# Patient Record
Sex: FEMALE
[De-identification: names, ages, dates, MRNs, and addresses within clinical notes are randomized per-mention and may not be internally consistent; named-entity substitution may affect disease eponyms.]

---

## 2022-06-20 PROBLEM — Z00.00 ENCOUNTER FOR PREVENTIVE HEALTH EXAMINATION: Status: ACTIVE | Noted: 2022-06-20

## 2022-06-22 ENCOUNTER — APPOINTMENT (OUTPATIENT)
Dept: OBGYN | Facility: CLINIC | Age: 52
End: 2022-06-22
Payer: COMMERCIAL

## 2022-06-22 VITALS
BODY MASS INDEX: 31.98 KG/M2 | DIASTOLIC BLOOD PRESSURE: 80 MMHG | SYSTOLIC BLOOD PRESSURE: 128 MMHG | HEIGHT: 66 IN | WEIGHT: 199 LBS

## 2022-06-22 DIAGNOSIS — Z12.11 ENCOUNTER FOR SCREENING FOR MALIGNANT NEOPLASM OF COLON: ICD-10-CM

## 2022-06-22 DIAGNOSIS — Z63.4 DISAPPEARANCE AND DEATH OF FAMILY MEMBER: ICD-10-CM

## 2022-06-22 DIAGNOSIS — Z12.12 ENCOUNTER FOR SCREENING FOR MALIGNANT NEOPLASM OF COLON: ICD-10-CM

## 2022-06-22 DIAGNOSIS — Z82.49 FAMILY HISTORY OF ISCHEMIC HEART DISEASE AND OTHER DISEASES OF THE CIRCULATORY SYSTEM: ICD-10-CM

## 2022-06-22 PROCEDURE — 99386 PREV VISIT NEW AGE 40-64: CPT | Mod: 25

## 2022-06-22 PROCEDURE — 99203 OFFICE O/P NEW LOW 30 MIN: CPT

## 2022-06-22 SDOH — SOCIAL STABILITY - SOCIAL INSECURITY: DISSAPEARANCE AND DEATH OF FAMILY MEMBER: Z63.4

## 2022-06-22 NOTE — PLAN
[FreeTextEntry1] : Unremarkable CBE and pelvic exam\par scan blood in vaginal vault, no polyps\par referred for Endosee/EMB for further evaluation\par Pap and HPV collected\par Mammogram ordered\par Dexascan ordered for long-term smoking in post menopausal woman\par Lung Scan ordered\par Referral for colonoscopy provided\par Patient counseled on smoking cessation but does not feel ready to quit at present\par Healthy diet, exercise, sleep hygiene discussed\par RTO x 1 year or prn\par

## 2022-06-22 NOTE — HISTORY OF PRESENT ILLNESS
[HIV test declined] : HIV test declined [Syphilis test declined] : Syphilis test declined [Gonorrhea test declined] : Gonorrhea test declined [Chlamydia test declined] : Chlamydia test declined [Trichomonas test declined] : Trichomonas test declined [HPV test declined] : HPV test declined [postmenopausal] : postmenopausal [N] : Patient is not sexually active [Y] : Positive pregnancy history [___ Days] : [unfilled] days [Light Bleeding] : light bleeding [FreeTextEntry1] : 50 yo  s/p menopause at age 50 with recent history of painless vaginal bleeding which started 6 days ago accompanied by breast tenderness.  She was  in March of this year.    Hx ovarian cyst and myoma, chlamydia . Abnormal Pap in  with "laser procedure' to remove abnormal cells (LEEP?).  Last Pap smear .  Current smoker.  \par TVUS at [facility] indicates EML of 0.4cm with 3mm cystic area.  Simple left ovarian cyst 3.0 x 2.7 x 2.9, intramural fibroid 2.5 x 2.8 x 2.7 and nabothian cysts noted.   [TextBox_4] : Last well woman exam 2006.  She has never had a mammogram, colonoscopy or DexaScan [LMPDate] : 2021 [PGHxTotal] : 3 [[facility] Hospit[facility]Term] : 3 [[facility] Hospitaliving] : 3 [TextBox_28] : Postmenopausal with breast tenderness

## 2022-06-23 LAB
FSH SERPL-MCNC: 17 IU/L
HPV HIGH+LOW RISK DNA PNL CVX: NOT DETECTED
TSH SERPL-ACNC: 1.74 UIU/ML

## 2022-06-24 ENCOUNTER — APPOINTMENT (OUTPATIENT)
Dept: MAMMOGRAPHY | Facility: CLINIC | Age: 52
End: 2022-06-24

## 2022-06-24 ENCOUNTER — RESULT REVIEW (OUTPATIENT)
Age: 52
End: 2022-06-24

## 2022-06-24 ENCOUNTER — APPOINTMENT (OUTPATIENT)
Dept: RADIOLOGY | Facility: CLINIC | Age: 52
End: 2022-06-24

## 2022-06-24 PROCEDURE — 77085 DXA BONE DENSITY AXL VRT FX: CPT

## 2022-06-24 PROCEDURE — 77067 SCR MAMMO BI INCL CAD: CPT

## 2022-06-24 PROCEDURE — 77063 BREAST TOMOSYNTHESIS BI: CPT

## 2022-06-28 LAB — CYTOLOGY CVX/VAG DOC THIN PREP: NORMAL

## 2022-06-30 ENCOUNTER — APPOINTMENT (OUTPATIENT)
Dept: OBGYN | Facility: CLINIC | Age: 52
End: 2022-06-30

## 2022-06-30 VITALS
SYSTOLIC BLOOD PRESSURE: 120 MMHG | BODY MASS INDEX: 31.98 KG/M2 | DIASTOLIC BLOOD PRESSURE: 66 MMHG | WEIGHT: 199 LBS | HEIGHT: 66 IN

## 2022-06-30 DIAGNOSIS — A63.0 ANOGENITAL (VENEREAL) WARTS: ICD-10-CM

## 2022-06-30 PROCEDURE — 58558Z: CUSTOM

## 2022-06-30 PROCEDURE — 56501 DESTROY VULVA LESIONS SIM: CPT

## 2022-06-30 NOTE — PHYSICAL EXAM
[Appropriately responsive] : appropriately responsive [Alert] : alert [No Acute Distress] : no acute distress [Soft] : soft [Non-tender] : non-tender [Non-distended] : non-distended [Oriented x3] : oriented x3 [Labia Majora] : normal [Labia Minora] : normal on the right [No Bleeding] : There was no active vaginal bleeding [Normal] : normal [Normal Position] : in a normal position [Uterine Adnexae] : normal [Declined] : Patient declined rectal exam [Tenderness] : nontender [Enlarged ___ wks] : not enlarged [Mass ___ cm] : no uterine mass was palpated [FreeTextEntry2] : genital wart to superior portion of left labia minora

## 2022-06-30 NOTE — PROCEDURE
[Hysteroscopy] : Hysteroscopy [Time out performed] : Pre-procedure time out performed.  Patient's name, date of birth and procedure confirmed. [Consent Obtained] : Consent obtained [Postmenopausal bleeding] : postmenopausal bleeding [Risks] : risks [Benefits] : benefits [Patient] : patient [Infection] : infection [Bleeding] : bleeding [Ibuprofen ___ mg] : ibuprofen [unfilled] ~Umg [flexible] : Using aseptic technique a hysteroscopy was performed using a flexible hysteroscope [Sent to Pathology] : specimen was placed in buffered formalin and sent for pathology [Hemostasis obtained] : hemostasis obtained [Tolerated Well] : Patient tolerated the procedure well [Antibiotics given] : antibiotics not given [de-identified] : Endosee hysteroscope inserted without difficulty   Lower uterine segment posterior wall with area of thickened lining   No obvious polyps or submucosal fibroids noted\par \par Pipelle inserted for endometrial biopsy [de-identified] : will notify patient of results and notify if any further follow up is required or recommended

## 2022-07-07 ENCOUNTER — NON-APPOINTMENT (OUTPATIENT)
Age: 52
End: 2022-07-07

## 2022-07-07 LAB — CORE LAB BIOPSY: NORMAL

## 2023-05-19 ENCOUNTER — APPOINTMENT (OUTPATIENT)
Dept: ULTRASOUND IMAGING | Facility: CLINIC | Age: 53
End: 2023-05-19
Payer: MEDICAID

## 2023-05-19 ENCOUNTER — TRANSCRIPTION ENCOUNTER (OUTPATIENT)
Age: 53
End: 2023-05-19

## 2023-05-19 PROCEDURE — 76536 US EXAM OF HEAD AND NECK: CPT

## 2023-05-22 ENCOUNTER — NON-APPOINTMENT (OUTPATIENT)
Age: 53
End: 2023-05-22

## 2023-05-22 VITALS — BODY MASS INDEX: 31.98 KG/M2 | HEIGHT: 66 IN | WEIGHT: 199 LBS

## 2023-05-22 DIAGNOSIS — F17.200 NICOTINE DEPENDENCE, UNSPECIFIED, UNCOMPLICATED: ICD-10-CM

## 2023-05-22 NOTE — HISTORY OF PRESENT ILLNESS
[Current] : Current [TextBox_13] : Ms. VANG is a 52 year old female with a history of HLD.\par \par She was called to review eligibility for Low-Dose CT lung cancer screening.  Reviewed and confirmed that the patient meets screening eligibility criteria:\par \par 52 years old \par \par Smoking Status:  Current Smoker\par \par Number of pack(s) per day: 1\par Number of years smoked: 35\par Number of pack years smokin\par \par No symptoms of lung cancer, including new cough, change in cough, hemoptysis, and unintentional weight loss.\par \par No personal history of lung cancer.  No lung cancer in a first degree relative.  No history of lung disease or occupational exposures. [PacksperDay] : 1 [N_Years] : 35 [PacksperYear] : 35

## 2023-06-01 ENCOUNTER — APPOINTMENT (OUTPATIENT)
Dept: CT IMAGING | Facility: CLINIC | Age: 53
End: 2023-06-01
Payer: MEDICAID

## 2023-06-01 ENCOUNTER — APPOINTMENT (OUTPATIENT)
Dept: MAMMOGRAPHY | Facility: CLINIC | Age: 53
End: 2023-06-01

## 2023-06-01 PROCEDURE — 77063 BREAST TOMOSYNTHESIS BI: CPT

## 2023-06-01 PROCEDURE — 77067 SCR MAMMO BI INCL CAD: CPT

## 2023-06-01 PROCEDURE — 71271 CT THORAX LUNG CANCER SCR C-: CPT

## 2023-07-20 ENCOUNTER — APPOINTMENT (OUTPATIENT)
Dept: ULTRASOUND IMAGING | Facility: CLINIC | Age: 53
End: 2023-07-20
Payer: MEDICAID

## 2023-07-20 PROCEDURE — 76642 ULTRASOUND BREAST LIMITED: CPT | Mod: LT

## 2023-08-15 ENCOUNTER — APPOINTMENT (OUTPATIENT)
Dept: MRI IMAGING | Facility: CLINIC | Age: 53
End: 2023-08-15

## 2024-01-29 ENCOUNTER — APPOINTMENT (OUTPATIENT)
Dept: OBGYN | Facility: CLINIC | Age: 54
End: 2024-01-29
Payer: MEDICAID

## 2024-01-29 VITALS
BODY MASS INDEX: 32.14 KG/M2 | WEIGHT: 200 LBS | DIASTOLIC BLOOD PRESSURE: 94 MMHG | SYSTOLIC BLOOD PRESSURE: 146 MMHG | HEIGHT: 66 IN

## 2024-01-29 DIAGNOSIS — Z01.419 ENCOUNTER FOR GYNECOLOGICAL EXAMINATION (GENERAL) (ROUTINE) W/OUT ABNORMAL FINDINGS: ICD-10-CM

## 2024-01-29 LAB
BILIRUB UR QL STRIP: NORMAL
CLARITY UR: CLEAR
COLLECTION METHOD: NORMAL
GLUCOSE UR-MCNC: NORMAL
HCG UR QL: 0.2 EU/DL
HGB UR QL STRIP.AUTO: NORMAL
KETONES UR-MCNC: NORMAL
LEUKOCYTE ESTERASE UR QL STRIP: NORMAL
NITRITE UR QL STRIP: NORMAL
PH UR STRIP: 7.5
PROT UR STRIP-MCNC: NORMAL
SP GR UR STRIP: 1.01

## 2024-01-29 PROCEDURE — 99396 PREV VISIT EST AGE 40-64: CPT

## 2024-01-29 PROCEDURE — 99213 OFFICE O/P EST LOW 20 MIN: CPT | Mod: 25

## 2024-01-29 NOTE — DISCUSSION/SUMMARY
[FreeTextEntry1] : unremarkable CBE and pelvic exams SBE  reviewed Pap/HPV collected Mammo ordered by PCP TVUS ordered to evaluate PMB TSH/FSH/LH drawn I reviewed dietary, vitamin and exercise measures for maintaining optimal bone density and patient verbalizes understanding of these recommendations. Healthy diet,exercise and sleep hygiene discussed The importance of a screening colonoscopy was discussed.

## 2024-01-29 NOTE — REVIEW OF SYSTEMS
[Patient Intake Form Reviewed] : Patient intake form was reviewed [Abn Vaginal bleeding] : abnormal vaginal bleeding [Breast Pain] : breast pain

## 2024-01-29 NOTE — HISTORY OF PRESENT ILLNESS
[FreeTextEntry1] : 52 yo  s/p menopause at age 50 with recent history of painless vaginal bleeding.  She was seen previously for PMB on 22.  States s/p menopause x 3 years but when her PCP did menopausal hormone tests they did not indicate menopause.  She also reports breast tenderness.  Mammograms ordered and managed by PCP.  Hx ovarian cyst and myomas, chlamydia . Abnormal Pap in  with "laser procedure' to remove abnormal cells (LEEP?). Last Pap smear . Current smoker.  Patient has not had annual well woman exam since  and agrees to one today.   Not sexually active and  x 2 years. [HIV test declined] : HIV test declined [Syphilis test declined] : Syphilis test declined [Gonorrhea test declined] : Gonorrhea test declined [Chlamydia test declined] : Chlamydia test declined [Trichomonas test declined] : Trichomonas test declined [HPV test offered] : HPV test offered [N] : Patient is not sexually active [Mammogramdate] : 6/2022 [PapSmeardate] : 6/22/22 [BoneDensityDate] : 6/24/22 [PGHxTotal] : 3 [[facility] CenterlTerm] : 3 [[facility] Centeriving] : 3

## 2024-01-29 NOTE — PHYSICAL EXAM
[Appropriately responsive] : appropriately responsive [Alert] : alert [No Acute Distress] : no acute distress [Regular Rate Rhythm] : regular rate rhythm [Clear to Auscultation B/L] : clear to auscultation bilaterally [Oriented x3] : oriented x3 [Examination Of The Breasts] : a normal appearance [No Masses] : no breast masses were palpable [Labia Majora] : normal [Labia Minora] : normal [Normal] : normal [Uterine Adnexae] : normal [FreeTextEntry4] : no blood in vault [FreeTextEntry5] : no polyp noted

## 2024-01-30 LAB
FSH SERPL-MCNC: 29.4 IU/L
HPV HIGH+LOW RISK DNA PNL CVX: NOT DETECTED
LH SERPL-ACNC: 15.3 IU/L
TSH SERPL-ACNC: 1.89 UIU/ML

## 2024-02-11 LAB — CYTOLOGY CVX/VAG DOC THIN PREP: NORMAL

## 2024-02-26 ENCOUNTER — ASOB RESULT (OUTPATIENT)
Age: 54
End: 2024-02-26

## 2024-02-26 ENCOUNTER — APPOINTMENT (OUTPATIENT)
Dept: ANTEPARTUM | Facility: CLINIC | Age: 54
End: 2024-02-26
Payer: MEDICAID

## 2024-02-26 ENCOUNTER — APPOINTMENT (OUTPATIENT)
Dept: OBGYN | Facility: CLINIC | Age: 54
End: 2024-02-26
Payer: MEDICAID

## 2024-02-26 VITALS
BODY MASS INDEX: 30.53 KG/M2 | HEIGHT: 66 IN | DIASTOLIC BLOOD PRESSURE: 74 MMHG | WEIGHT: 190 LBS | SYSTOLIC BLOOD PRESSURE: 126 MMHG

## 2024-02-26 VITALS — SYSTOLIC BLOOD PRESSURE: 136 MMHG | DIASTOLIC BLOOD PRESSURE: 96 MMHG

## 2024-02-26 VITALS — DIASTOLIC BLOOD PRESSURE: 79 MMHG | SYSTOLIC BLOOD PRESSURE: 116 MMHG

## 2024-02-26 VITALS — DIASTOLIC BLOOD PRESSURE: 78 MMHG | SYSTOLIC BLOOD PRESSURE: 123 MMHG

## 2024-02-26 DIAGNOSIS — N95.0 POSTMENOPAUSAL BLEEDING: ICD-10-CM

## 2024-02-26 DIAGNOSIS — F17.210 NICOTINE DEPENDENCE, CIGARETTES, UNCOMPLICATED: ICD-10-CM

## 2024-02-26 PROCEDURE — 76830 TRANSVAGINAL US NON-OB: CPT

## 2024-02-26 PROCEDURE — 76856 US EXAM PELVIC COMPLETE: CPT | Mod: 59

## 2024-02-26 PROCEDURE — 99213 OFFICE O/P EST LOW 20 MIN: CPT

## 2024-02-26 NOTE — HISTORY OF PRESENT ILLNESS
[FreeTextEntry1] : 52 yo  for follow up of PMB.  Patient states it was "barely a spot" and has not happened since.  TVUS today:  ENL=2mm Post left lateral intramural myoma 2 x 2.1 x 1.2 Ovaries WNL No adnexal masses or free fluid  Patient has lost 10 pounds since last visit on 24 and is in process of smoking necessitation and beginning to exercise.  BP on right 123/78, on left 136/96 Repeat: left 116/79

## 2024-02-26 NOTE — PHYSICAL EXAM
[Appropriately responsive] : appropriately responsive [No Acute Distress] : no acute distress [Alert] : alert [Regular Rate Rhythm] : regular rate rhythm [Clear to Auscultation B/L] : clear to auscultation bilaterally